# Patient Record
Sex: MALE | Race: WHITE | NOT HISPANIC OR LATINO
[De-identification: names, ages, dates, MRNs, and addresses within clinical notes are randomized per-mention and may not be internally consistent; named-entity substitution may affect disease eponyms.]

---

## 2017-10-06 ENCOUNTER — RX RENEWAL (OUTPATIENT)
Age: 62
End: 2017-10-06

## 2017-11-05 ENCOUNTER — RECORD ABSTRACTING (OUTPATIENT)
Age: 62
End: 2017-11-05

## 2017-11-05 DIAGNOSIS — D69.6 THROMBOCYTOPENIA, UNSPECIFIED: ICD-10-CM

## 2017-11-05 DIAGNOSIS — Z82.69 FAMILY HISTORY OF OTHER DISEASES OF THE MUSCULOSKELETAL SYSTEM AND CONNECTIVE TISSUE: ICD-10-CM

## 2017-11-05 DIAGNOSIS — K21.9 GASTRO-ESOPHAGEAL REFLUX DISEASE W/OUT ESOPHAGITIS: ICD-10-CM

## 2017-11-05 DIAGNOSIS — Z80.0 FAMILY HISTORY OF MALIGNANT NEOPLASM OF DIGESTIVE ORGANS: ICD-10-CM

## 2017-11-10 ENCOUNTER — APPOINTMENT (OUTPATIENT)
Dept: FAMILY MEDICINE | Facility: CLINIC | Age: 62
End: 2017-11-10
Payer: COMMERCIAL

## 2017-11-10 VITALS
BODY MASS INDEX: 26.66 KG/M2 | HEIGHT: 69 IN | SYSTOLIC BLOOD PRESSURE: 142 MMHG | HEART RATE: 58 BPM | WEIGHT: 180 LBS | DIASTOLIC BLOOD PRESSURE: 84 MMHG | TEMPERATURE: 97.9 F | OXYGEN SATURATION: 97 % | RESPIRATION RATE: 16 BRPM

## 2017-11-10 DIAGNOSIS — Z87.891 PERSONAL HISTORY OF NICOTINE DEPENDENCE: ICD-10-CM

## 2017-11-10 PROCEDURE — 99214 OFFICE O/P EST MOD 30 MIN: CPT | Mod: 25

## 2017-11-10 PROCEDURE — G0008: CPT

## 2017-11-10 PROCEDURE — 90686 IIV4 VACC NO PRSV 0.5 ML IM: CPT

## 2017-12-03 ENCOUNTER — TRANSCRIPTION ENCOUNTER (OUTPATIENT)
Age: 62
End: 2017-12-03

## 2018-07-23 PROBLEM — Z80.0 FAMILY HISTORY OF COLON CANCER: Status: ACTIVE | Noted: 2017-11-05

## 2018-08-09 ENCOUNTER — APPOINTMENT (OUTPATIENT)
Dept: FAMILY MEDICINE | Facility: CLINIC | Age: 63
End: 2018-08-09
Payer: COMMERCIAL

## 2018-08-09 ENCOUNTER — NON-APPOINTMENT (OUTPATIENT)
Age: 63
End: 2018-08-09

## 2018-08-09 VITALS
RESPIRATION RATE: 12 BRPM | HEIGHT: 69 IN | OXYGEN SATURATION: 97 % | SYSTOLIC BLOOD PRESSURE: 130 MMHG | BODY MASS INDEX: 27.25 KG/M2 | WEIGHT: 184 LBS | HEART RATE: 60 BPM | DIASTOLIC BLOOD PRESSURE: 80 MMHG

## 2018-08-09 PROCEDURE — 99243 OFF/OP CNSLTJ NEW/EST LOW 30: CPT | Mod: 25

## 2018-08-09 PROCEDURE — 93000 ELECTROCARDIOGRAM COMPLETE: CPT

## 2018-08-09 NOTE — CONSULT LETTER
[Dear  ___] : Dear  [unfilled], [Consult Letter:] : I had the pleasure of evaluating your patient, [unfilled]. [Consult Closing:] : Thank you very much for allowing me to participate in the care of this patient.  If you have any questions, please do not hesitate to contact me. [FreeTextEntry1] : Patient medically cleared

## 2018-08-09 NOTE — RESULTS/DATA
[ECG Reviewed] : reviewed [Normal] : The 12 - lead ECG is normal [Ventricular Rate___] : ventricular rate is [unfilled] beats per minute [Sinus Bradycardia] : sinus bradycardia [P Waves Normal] : the P wave is normal [Normal QRS] : the QRS is normal [Normal ST Segments] : the ST segments are normal [] : not indicated

## 2018-08-09 NOTE — HISTORY OF PRESENT ILLNESS
[No Pertinent Cardiac History] : no history of aortic stenosis, atrial fibrillation, coronary artery disease, recent myocardial infarction, or implantable device/pacemaker [No Pertinent Pulmonary History] : no history of asthma, COPD, sleep apnea, or smoking [No Adverse Anesthesia Reaction] : no adverse anesthesia reaction in self or family member [(Patient denies any chest pain, claudication, dyspnea on exertion, orthopnea, palpitations or syncope)] : Patient denies any chest pain, claudication, dyspnea on exertion, orthopnea, palpitations or syncope [Aortic Stenosis] : no aortic stenosis [Atrial Fibrillation] : no atrial fibrillation [Coronary Artery Disease] : no coronary artery disease [Recent Myocardial Infarction] : no recent myocardial infarction [Implantable Device/Pacemaker] : no implantable device/pacemaker [Asthma] : no asthma [COPD] : no COPD [Sleep Apnea] : no sleep apnea [Smoker] : not a smoker [Family Member] : no family member with adverse anesthesia reaction/sudden death [Self] : no previous adverse anesthesia reaction [Chronic Anticoagulation] : no chronic anticoagulation [Chronic Kidney Disease] : no chronic kidney disease [Diabetes] : no diabetes [FreeTextEntry1] : od Cataract [FreeTextEntry2] : 8/19/2018 [FreeTextEntry3] : Dr Ricardo Guerrero [FreeTextEntry4] : pt presents for medical clearance  for cataract

## 2018-08-09 NOTE — ASSESSMENT
[High Risk Surgery - Intraperitoneal, Intrathoracic or Supringuinal Vascular Procedures] : High Risk Surgery - Intraperitoneal, Intrathoracic or Supringuinal Vascular Procedures - No (0) [Ischemic Heart Disease] : Ischemic Heart Disease - No (0) [Congestive Heart Failure] : Congestive Heart Failure - No (0) [Prior Cerebrovascular Accident or TIA] : Prior Cerebrovascular Accident or TIA - No (0) [Creatinine >= 2mg/dL (1 Point)] : Creatinine >= 2mg/dL - No (0) [Insulin-dependent Diabetic (1 Point)] : Insulin-dependent Diabetic - No (0) [0] : 0 , RCRI Class: I, Risk of Post-Op Cardiac Complications: 0.4%, Procedure Risk: Low-Risk [Patient Optimized for Surgery] : Patient optimized for surgery [No Further Testing Recommended] : no further testing recommended [Continue medications as is] : Continue current medications [As per surgery] : as per surgery [FreeTextEntry4] : patient is medically cleared. \par

## 2018-08-09 NOTE — PHYSICAL EXAM
[Well Developed] : well developed [Well Nourished] : well nourished [Normal Rhythm/Effort] : normal respiratory rhythm and effort [Clear Bilaterally] : the lungs were clear to auscultation bilaterally [Normal to Percussion] : the lungs were normal to percussion [Normal] : palpation of the chest was normal [Normal Rate] : normal [Normal S1] : normal S1 [Normal S2] : normal S2 [No Murmur] : no murmurs heard [No Pitting Edema] : no pitting edema present [2+] : left 2+ [No Abnormalities] : the abdominal aorta was not enlarged and no bruit was heard [S3] : no S3 [S4] : no S4 [Right Carotid Bruit] : no bruit heard over the right carotid [Left Carotid Bruit] : no bruit heard over the left carotid [Right Femoral Bruit] : no bruit heard over the right femoral artery [Left Femoral Bruit] : no bruit heard over the left femoral artery [de-identified] : right eye worse vision at nighttime.

## 2018-10-01 ENCOUNTER — MEDICATION RENEWAL (OUTPATIENT)
Age: 63
End: 2018-10-01

## 2019-01-16 ENCOUNTER — APPOINTMENT (OUTPATIENT)
Dept: FAMILY MEDICINE | Facility: CLINIC | Age: 64
End: 2019-01-16

## 2019-05-15 ENCOUNTER — APPOINTMENT (OUTPATIENT)
Dept: FAMILY MEDICINE | Facility: CLINIC | Age: 64
End: 2019-05-15
Payer: COMMERCIAL

## 2019-05-15 ENCOUNTER — NON-APPOINTMENT (OUTPATIENT)
Age: 64
End: 2019-05-15

## 2019-05-15 VITALS
OXYGEN SATURATION: 96 % | BODY MASS INDEX: 26.66 KG/M2 | HEIGHT: 69 IN | HEART RATE: 88 BPM | RESPIRATION RATE: 14 BRPM | WEIGHT: 180 LBS

## 2019-05-15 DIAGNOSIS — L98.9 DISORDER OF THE SKIN AND SUBCUTANEOUS TISSUE, UNSPECIFIED: ICD-10-CM

## 2019-05-15 DIAGNOSIS — Z92.29 PERSONAL HISTORY OF OTHER DRUG THERAPY: ICD-10-CM

## 2019-05-15 PROCEDURE — 99242 OFF/OP CONSLTJ NEW/EST SF 20: CPT | Mod: 25

## 2019-05-15 PROCEDURE — 36415 COLL VENOUS BLD VENIPUNCTURE: CPT

## 2019-05-15 PROCEDURE — 93000 ELECTROCARDIOGRAM COMPLETE: CPT

## 2019-05-15 NOTE — ASSESSMENT
[High Risk Surgery - Intraperitoneal, Intrathoracic or Supringuinal Vascular Procedures] : High Risk Surgery - Intraperitoneal, Intrathoracic or Supringuinal Vascular Procedures - No (0) [Ischemic Heart Disease] : Ischemic Heart Disease - No (0) [Congestive Heart Failure] : Congestive Heart Failure - No (0) [Prior Cerebrovascular Accident or TIA] : Prior Cerebrovascular Accident or TIA - No (0) [Insulin-dependent Diabetic (1 Point)] : Insulin-dependent Diabetic - No (0) [Creatinine >= 2mg/dL (1 Point)] : Creatinine >= 2mg/dL - No (0) [0] : 0 , RCRI Class: I, Risk of Post-Op Cardiac Complications: 0.4%, Procedure Risk: Low-Risk [Patient Optimized for Surgery] : Patient optimized for surgery [As per surgery] : as per surgery [No Further Testing Recommended] : no further testing recommended [Continue medications as is] : Continue current medications [FreeTextEntry4] : patient medically cleared

## 2019-05-15 NOTE — PHYSICAL EXAM
[No Acute Distress] : no acute distress [Well Nourished] : well nourished [Well Developed] : well developed [Well-Appearing] : well-appearing [EOMI] : extraocular movements intact [PERRL] : pupils equal round and reactive to light [Normal Sclera/Conjunctiva] : normal sclera/conjunctiva [Normal Oropharynx] : the oropharynx was normal [No JVD] : no jugular venous distention [Normal Outer Ear/Nose] : the outer ears and nose were normal in appearance [Supple] : supple [No Lymphadenopathy] : no lymphadenopathy [Thyroid Normal, No Nodules] : the thyroid was normal and there were no nodules present [No Respiratory Distress] : no respiratory distress  [Clear to Auscultation] : lungs were clear to auscultation bilaterally [No Accessory Muscle Use] : no accessory muscle use [Regular Rhythm] : with a regular rhythm [Normal Rate] : normal rate  [No Murmur] : no murmur heard [Normal S1, S2] : normal S1 and S2 [No Carotid Bruits] : no carotid bruits [No Abdominal Bruit] : a ~M bruit was not heard ~T in the abdomen [No Varicosities] : no varicosities [No Edema] : there was no peripheral edema [Pedal Pulses Present] : the pedal pulses are present [No Palpable Aorta] : no palpable aorta [No Extremity Clubbing/Cyanosis] : no extremity clubbing/cyanosis [Soft] : abdomen soft [Non Tender] : non-tender [No Masses] : no abdominal mass palpated [Non-distended] : non-distended [Normal Bowel Sounds] : normal bowel sounds [No HSM] : no HSM [Normal Posterior Cervical Nodes] : no posterior cervical lymphadenopathy [Normal Anterior Cervical Nodes] : no anterior cervical lymphadenopathy [No CVA Tenderness] : no CVA  tenderness [No Joint Swelling] : no joint swelling [No Spinal Tenderness] : no spinal tenderness [No Rash] : no rash [Grossly Normal Strength/Tone] : grossly normal strength/tone [Normal Gait] : normal gait [Coordination Grossly Intact] : coordination grossly intact [No Focal Deficits] : no focal deficits [Deep Tendon Reflexes (DTR)] : deep tendon reflexes were 2+ and symmetric [Normal Insight/Judgement] : insight and judgment were intact [Normal Affect] : the affect was normal

## 2019-05-15 NOTE — RESULTS/DATA
[ECG Reviewed] : reviewed [Normal] : The 12 - lead ECG is normal [NSR] : normal sinus rhythm [Normal QRS] : the QRS is normal [P Waves Normal] : the P wave is normal [Normal ST Segments] : the ST segments are normal [Ventricular Rate___] : ventricular rate is [unfilled] beats per minute

## 2019-05-15 NOTE — HISTORY OF PRESENT ILLNESS
[No Pertinent Pulmonary History] : no history of asthma, COPD, sleep apnea, or smoking [No Pertinent Cardiac History] : no history of aortic stenosis, atrial fibrillation, coronary artery disease, recent myocardial infarction, or implantable device/pacemaker [No Adverse Anesthesia Reaction] : no adverse anesthesia reaction in self or family member [(Patient denies any chest pain, claudication, dyspnea on exertion, orthopnea, palpitations or syncope)] : Patient denies any chest pain, claudication, dyspnea on exertion, orthopnea, palpitations or syncope [Chronic Anticoagulation] : no chronic anticoagulation [Chronic Kidney Disease] : no chronic kidney disease [Diabetes] : no diabetes [FreeTextEntry1] : Cataract extraction with lens implant [FreeTextEntry2] : 05/21/2019 [FreeTextEntry4] : patient presents for medical clearance  for cataract surgery with lens implant [FreeTextEntry3] : Julio C mensah MD

## 2019-05-21 ENCOUNTER — TRANSCRIPTION ENCOUNTER (OUTPATIENT)
Age: 64
End: 2019-05-21

## 2019-05-21 LAB
MEV IGG FLD QL IA: <5 AU/ML
MEV IGG+IGM SER-IMP: NEGATIVE
MUV AB SER-ACNC: POSITIVE
MUV IGG SER QL IA: 95.1 AU/ML
RUBV IGG FLD-ACNC: <0.1 INDEX
RUBV IGG SER-IMP: NEGATIVE

## 2019-06-13 ENCOUNTER — APPOINTMENT (OUTPATIENT)
Dept: FAMILY MEDICINE | Facility: CLINIC | Age: 64
End: 2019-06-13
Payer: COMMERCIAL

## 2019-06-13 PROCEDURE — 90471 IMMUNIZATION ADMIN: CPT

## 2019-06-13 PROCEDURE — 90707 MMR VACCINE SC: CPT

## 2020-01-10 ENCOUNTER — RX RENEWAL (OUTPATIENT)
Age: 65
End: 2020-01-10

## 2020-10-30 ENCOUNTER — APPOINTMENT (OUTPATIENT)
Dept: FAMILY MEDICINE | Facility: CLINIC | Age: 65
End: 2020-10-30
Payer: COMMERCIAL

## 2020-10-30 DIAGNOSIS — Z87.09 PERSONAL HISTORY OF OTHER DISEASES OF THE RESPIRATORY SYSTEM: ICD-10-CM

## 2020-10-30 DIAGNOSIS — Z01.818 ENCOUNTER FOR OTHER PREPROCEDURAL EXAMINATION: ICD-10-CM

## 2020-10-30 PROCEDURE — 99442: CPT

## 2020-10-30 NOTE — HISTORY OF PRESENT ILLNESS
[FreeTextEntry8] : Patient initiated communication for concern via HIPAA secure platform  (Telemedicine )  for         sinus             using   phone           .Reviewed quarantine instructions and self isolation to limit spread of disease  as per [place] guidelines.  Patient is an established patient and has verbalized consent to be treated via telemedicine .\par Over the years I have clogged ears and my teeth were hurting  right sinus was painful  I took a shower and the teeth pain went away. I went to the dentist yesterday no tooth problem\par no fever chills cough \par no recent travel\par no covid exposure\par

## 2020-10-30 NOTE — REVIEW OF SYSTEMS
[Postnasal Drip] : postnasal drip [Nasal Discharge] : nasal discharge [Negative] : Respiratory [FreeTextEntry4] : teeth  pain

## 2020-10-30 NOTE — ASSESSMENT
[FreeTextEntry1] : Take antibiotics,  rest,  increase fluids, wash hands to prevent the spread of  infection . Take mucinex   over the counter. Take tylenol or advil for fever or body aches. Follow up if no better or worse respiratory symptoms.\par Please note this assessment was done using telemedicine as a result of social distancing due to the outbreak of COVID 19 .\par

## 2020-12-23 PROBLEM — Z87.09 HISTORY OF ACUTE SINUSITIS: Status: RESOLVED | Noted: 2020-10-30 | Resolved: 2020-12-23

## 2020-12-29 ENCOUNTER — RX RENEWAL (OUTPATIENT)
Age: 65
End: 2020-12-29

## 2020-12-31 ENCOUNTER — NON-APPOINTMENT (OUTPATIENT)
Age: 65
End: 2020-12-31

## 2020-12-31 ENCOUNTER — APPOINTMENT (OUTPATIENT)
Dept: FAMILY MEDICINE | Facility: CLINIC | Age: 65
End: 2020-12-31
Payer: COMMERCIAL

## 2020-12-31 VITALS
OXYGEN SATURATION: 98 % | WEIGHT: 180 LBS | RESPIRATION RATE: 14 BRPM | HEART RATE: 80 BPM | SYSTOLIC BLOOD PRESSURE: 150 MMHG | BODY MASS INDEX: 26.66 KG/M2 | TEMPERATURE: 97.8 F | HEIGHT: 69 IN | DIASTOLIC BLOOD PRESSURE: 90 MMHG

## 2020-12-31 DIAGNOSIS — I10 ESSENTIAL (PRIMARY) HYPERTENSION: ICD-10-CM

## 2020-12-31 PROCEDURE — 36415 COLL VENOUS BLD VENIPUNCTURE: CPT

## 2020-12-31 PROCEDURE — 93000 ELECTROCARDIOGRAM COMPLETE: CPT

## 2020-12-31 PROCEDURE — 99213 OFFICE O/P EST LOW 20 MIN: CPT | Mod: 25

## 2020-12-31 PROCEDURE — 99072 ADDL SUPL MATRL&STAF TM PHE: CPT

## 2020-12-31 RX ORDER — AMOXICILLIN AND CLAVULANATE POTASSIUM 875; 125 MG/1; MG/1
875-125 TABLET, COATED ORAL
Qty: 20 | Refills: 0 | Status: DISCONTINUED | COMMUNITY
Start: 2020-10-30 | End: 2020-12-31

## 2020-12-31 NOTE — ASSESSMENT
[FreeTextEntry1] : Basic cardiovascular prevention measures are advised including regular exercise, surveillance medical examination, and prudent portion-controlled low fat diet, rich in a variety of vegetables with minimal added sugars, refined starches, and no artificially hydrogenated oils.\par Discussion and interpretation of previous tests , external notes , radiology, specialist gastro derm , patient verbalized understanding.\par Prescription drug management- lotrel renewed \par white coat htn - cont to check bp outside of office\par EKG performed on this day in the office and reviewed by GLORIA Hernández. It is stable sinus harrison\par

## 2020-12-31 NOTE — HISTORY OF PRESENT ILLNESS
[FreeTextEntry1] : Patient present for medication renewal\par  [de-identified] : 64 yo wm here for htn\par He sees dermatology and gastro 130/84 - he had his colonoscopy it was excellent\par Otherwise patient reports feeling well.  Patient specifically denies chest pain, shortness of breath, dyspnea on exertion, edema, PND, orthopnea, dizziness, or syncope. Patient reports compliance with medications. Patient denies fever, chills, night sweats, nausea, vomiting , no pain, erythema, swollen joints, hematuria, hematochezia , hematemesis, or melena.\par  \par

## 2020-12-31 NOTE — HEALTH RISK ASSESSMENT
[] : No [No] : In the past 12 months have you used drugs other than those required for medical reasons? No [de-identified] : colonoscopy [de-identified] : gastro derm [Audit-CScore] : 0 [de-identified] : yes [de-identified] : good

## 2021-01-01 ENCOUNTER — TRANSCRIPTION ENCOUNTER (OUTPATIENT)
Age: 66
End: 2021-01-01

## 2021-01-01 LAB
ALBUMIN SERPL ELPH-MCNC: 4.9 G/DL
ALP BLD-CCNC: 99 U/L
ALT SERPL-CCNC: 64 U/L
ANION GAP SERPL CALC-SCNC: 16 MMOL/L
AST SERPL-CCNC: 55 U/L
BASOPHILS # BLD AUTO: 0.08 K/UL
BASOPHILS NFR BLD AUTO: 1 %
BILIRUB SERPL-MCNC: 1.1 MG/DL
BUN SERPL-MCNC: 18 MG/DL
CALCIUM SERPL-MCNC: 9.5 MG/DL
CHLORIDE SERPL-SCNC: 101 MMOL/L
CO2 SERPL-SCNC: 19 MMOL/L
CREAT SERPL-MCNC: 0.99 MG/DL
EOSINOPHIL # BLD AUTO: 0.1 K/UL
EOSINOPHIL NFR BLD AUTO: 1.3 %
GLUCOSE SERPL-MCNC: 99 MG/DL
HCT VFR BLD CALC: 50.1 %
HGB BLD-MCNC: 16.9 G/DL
IMM GRANULOCYTES NFR BLD AUTO: 0.1 %
IRON SATN MFR SERPL: 51 %
IRON SERPL-MCNC: 166 UG/DL
LYMPHOCYTES # BLD AUTO: 2.23 K/UL
LYMPHOCYTES NFR BLD AUTO: 28.8 %
MAGNESIUM SERPL-MCNC: 2.3 MG/DL
MAN DIFF?: NORMAL
MCHC RBC-ENTMCNC: 31 PG
MCHC RBC-ENTMCNC: 33.7 GM/DL
MCV RBC AUTO: 91.8 FL
MONOCYTES # BLD AUTO: 0.51 K/UL
MONOCYTES NFR BLD AUTO: 6.6 %
NEUTROPHILS # BLD AUTO: 4.81 K/UL
NEUTROPHILS NFR BLD AUTO: 62.2 %
PLATELET # BLD AUTO: 150 K/UL
POTASSIUM SERPL-SCNC: 4.6 MMOL/L
PROT SERPL-MCNC: 7.7 G/DL
PSA SERPL-MCNC: 1.78 NG/ML
RBC # BLD: 5.46 M/UL
RBC # FLD: 12.4 %
SODIUM SERPL-SCNC: 136 MMOL/L
TIBC SERPL-MCNC: 323 UG/DL
TSH SERPL-ACNC: 1.78 UIU/ML
UIBC SERPL-MCNC: 157 UG/DL
WBC # FLD AUTO: 7.74 K/UL

## 2022-01-02 ENCOUNTER — RX RENEWAL (OUTPATIENT)
Age: 67
End: 2022-01-02

## 2022-01-03 ENCOUNTER — TRANSCRIPTION ENCOUNTER (OUTPATIENT)
Age: 67
End: 2022-01-03

## 2022-03-04 ENCOUNTER — TRANSCRIPTION ENCOUNTER (OUTPATIENT)
Age: 67
End: 2022-03-04

## 2022-07-04 DIAGNOSIS — Z00.00 ENCOUNTER FOR GENERAL ADULT MEDICAL EXAMINATION W/OUT ABNORMAL FINDINGS: ICD-10-CM

## 2022-07-14 ENCOUNTER — APPOINTMENT (OUTPATIENT)
Dept: FAMILY MEDICINE | Facility: CLINIC | Age: 67
End: 2022-07-14

## 2022-07-14 ENCOUNTER — NON-APPOINTMENT (OUTPATIENT)
Age: 67
End: 2022-07-14

## 2022-07-14 VITALS
HEART RATE: 78 BPM | DIASTOLIC BLOOD PRESSURE: 100 MMHG | OXYGEN SATURATION: 97 % | SYSTOLIC BLOOD PRESSURE: 150 MMHG | HEIGHT: 69 IN | BODY MASS INDEX: 27.4 KG/M2 | WEIGHT: 185 LBS | RESPIRATION RATE: 14 BRPM

## 2022-07-14 VITALS — TEMPERATURE: 96.6 F

## 2022-07-14 DIAGNOSIS — I45.10 UNSPECIFIED RIGHT BUNDLE-BRANCH BLOCK: ICD-10-CM

## 2022-07-14 DIAGNOSIS — C44.319 BASAL CELL CARCINOMA OF SKIN OF OTHER PARTS OF FACE: ICD-10-CM

## 2022-07-14 DIAGNOSIS — Z86.69 PERSONAL HISTORY OF OTHER DISEASES OF THE NERVOUS SYSTEM AND SENSE ORGANS: ICD-10-CM

## 2022-07-14 PROCEDURE — 99396 PREV VISIT EST AGE 40-64: CPT | Mod: 25

## 2022-07-14 PROCEDURE — 93000 ELECTROCARDIOGRAM COMPLETE: CPT

## 2022-07-14 RX ORDER — PANTOPRAZOLE 40 MG/1
40 TABLET, DELAYED RELEASE ORAL
Qty: 90 | Refills: 0 | Status: ACTIVE | COMMUNITY
Start: 2021-10-04

## 2022-07-14 NOTE — HEALTH RISK ASSESSMENT
[No falls in past year] : Patient reported no falls in the past year [0] : 2) Feeling down, depressed, or hopeless: Not at all (0) [PHQ-2 Negative - No further assessment needed] : PHQ-2 Negative - No further assessment needed [I have developed a follow-up plan documented below in the note.] : I have developed a follow-up plan documented below in the note. [HIV test declined] : HIV test declined [Hepatitis C test declined] : Hepatitis C test declined [With Family] : lives with family [# of Members in Household ___] :  household currently consist of [unfilled] member(s) [Employed] : employed [College] : College [] :  [# Of Children ___] : has [unfilled] children [Fully functional (bathing, dressing, toileting, transferring, walking, feeding)] : Fully functional (bathing, dressing, toileting, transferring, walking, feeding) [Fully functional (using the telephone, shopping, preparing meals, housekeeping, doing laundry, using] : Fully functional and needs no help or supervision to perform IADLs (using the telephone, shopping, preparing meals, housekeeping, doing laundry, using transportation, managing medications and managing finances) [Reports normal functional visual acuity (ie: able to read med bottle)] : Reports normal functional visual acuity [Smoke Detector] : smoke detector [Carbon Monoxide Detector] : carbon monoxide detector [Safety elements used in home] : safety elements used in home [Seat Belt] :  uses seat belt [Sunscreen] : uses sunscreen [With Patient/Caregiver] : , with patient/caregiver [Designated Healthcare Proxy] : Designated healthcare proxy [Name: ___] : Health Care Proxy's Name: [unfilled]  [Aggressive treatment] : aggressive treatment [Good] : ~his/her~  mood as  good [Former] : Former [10-14] : 10-14 [No] : In the past 12 months have you used drugs other than those required for medical reasons? 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 [de-identified] : 1 ppd 15 y [YearQuit] : 1997 [Audit-CScore] : 0 [de-identified] : gym 5x  week [de-identified] : yes [[id number]] : 0 [EyeExamDate] : 12/2022 [Change in mental status noted] : No change in mental status noted [Language] : denies difficulty with language [Behavior] : denies difficulty with behavior [Learning/Retaining New Information] : denies difficulty learning/retaining new information [Handling Complex Tasks] : denies difficulty handling complex tasks [Reasoning] : denies difficulty with reasoning [Spatial Ability and Orientation] : denies difficulty with spatial ability and orientation [Reports changes in hearing] : Reports no changes in hearing [Reports changes in vision] : Reports no changes in vision [Reports changes in dental health] : Reports no changes in dental health [Guns at Home] : no guns at home [Travel to Developing Areas] : does not  travel to developing areas [TB Exposure] : is not being exposed to tuberculosis [Caregiver Concerns] : does not have caregiver concerns [ColonoscopyDate] : 8/25/2020 [ColonoscopyComments] : diverticulosis sigmoid  [AdvancecareDate] : 7/14/22

## 2022-07-14 NOTE — ASSESSMENT
[FreeTextEntry1] : Basic cardiovascular prevention measures are advised including regular exercise, surveillance medical examination, and prudent portion-controlled low fat diet, rich in a variety of vegetables with minimal added sugars, refined starches, and no artificially hydrogenated oils.\par Discussion and interpretation of previous tests , external notes( labs, radiology, specialist  , patient verbalized understanding.\par Prescription drug management- cont lotrel I did not renew he has enough\par EKG performed on this day in the office  to evaluate for any ischemia, new arrhythmia, and for any significant changes and reviewed by GLORIA Hernández.  RBBB new refer to cardiology\par  \par  \par colonoscopy utd\par  suggest prev 20 \par suggest covid in sept or oct /flu combination\par \par Labs to be drawn/ specimens obtained  at outside  lab    for evaluation of   assessed conditions - cbc cmp lipid    hgba1c for physical and screening purposes.\par \par

## 2022-08-24 ENCOUNTER — APPOINTMENT (OUTPATIENT)
Dept: FAMILY MEDICINE | Facility: CLINIC | Age: 67
End: 2022-08-24

## 2022-08-24 PROCEDURE — 90707 MMR VACCINE SC: CPT

## 2022-08-24 PROCEDURE — 90471 IMMUNIZATION ADMIN: CPT

## 2022-09-21 DIAGNOSIS — Z11.59 ENCOUNTER FOR SCREENING FOR OTHER VIRAL DISEASES: ICD-10-CM

## 2022-09-21 DIAGNOSIS — Z92.29 PERSONAL HISTORY OF OTHER DRUG THERAPY: ICD-10-CM

## 2023-12-21 ENCOUNTER — RX RENEWAL (OUTPATIENT)
Age: 68
End: 2023-12-21

## 2024-01-29 ENCOUNTER — APPOINTMENT (OUTPATIENT)
Dept: FAMILY MEDICINE | Facility: CLINIC | Age: 69
End: 2024-01-29
Payer: COMMERCIAL

## 2024-01-29 VITALS
HEART RATE: 77 BPM | TEMPERATURE: 97.8 F | BODY MASS INDEX: 30.66 KG/M2 | SYSTOLIC BLOOD PRESSURE: 128 MMHG | RESPIRATION RATE: 14 BRPM | DIASTOLIC BLOOD PRESSURE: 82 MMHG | WEIGHT: 207 LBS | HEIGHT: 69 IN | OXYGEN SATURATION: 98 %

## 2024-01-29 DIAGNOSIS — Z11.59 ENCOUNTER FOR SCREENING FOR OTHER VIRAL DISEASES: ICD-10-CM

## 2024-01-29 DIAGNOSIS — Z87.440 PERSONAL HISTORY OF URINARY (TRACT) INFECTIONS: ICD-10-CM

## 2024-01-29 DIAGNOSIS — Z87.11 PERSONAL HISTORY OF PEPTIC ULCER DISEASE: ICD-10-CM

## 2024-01-29 DIAGNOSIS — M25.542 PAIN IN JOINTS OF LEFT HAND: ICD-10-CM

## 2024-01-29 DIAGNOSIS — Z23 ENCOUNTER FOR IMMUNIZATION: ICD-10-CM

## 2024-01-29 DIAGNOSIS — Z00.00 ENCOUNTER FOR GENERAL ADULT MEDICAL EXAMINATION W/OUT ABNORMAL FINDINGS: ICD-10-CM

## 2024-01-29 DIAGNOSIS — Z92.89 PERSONAL HISTORY OF OTHER MEDICAL TREATMENT: ICD-10-CM

## 2024-01-29 PROCEDURE — 99397 PER PM REEVAL EST PAT 65+ YR: CPT | Mod: 25

## 2024-01-29 PROCEDURE — 90715 TDAP VACCINE 7 YRS/> IM: CPT

## 2024-01-29 PROCEDURE — 90471 IMMUNIZATION ADMIN: CPT

## 2024-01-29 NOTE — PHYSICAL EXAM
[No Acute Distress] : no acute distress [Well Nourished] : well nourished [Well Developed] : well developed [Well-Appearing] : well-appearing [Normal Sclera/Conjunctiva] : normal sclera/conjunctiva [PERRL] : pupils equal round and reactive to light [EOMI] : extraocular movements intact [Normal Outer Ear/Nose] : the outer ears and nose were normal in appearance [Normal Oropharynx] : the oropharynx was normal [No JVD] : no jugular venous distention [No Lymphadenopathy] : no lymphadenopathy [Supple] : supple [Thyroid Normal, No Nodules] : the thyroid was normal and there were no nodules present [No Respiratory Distress] : no respiratory distress  [No Accessory Muscle Use] : no accessory muscle use [Clear to Auscultation] : lungs were clear to auscultation bilaterally [Normal Rate] : normal rate  [Regular Rhythm] : with a regular rhythm [Normal S1, S2] : normal S1 and S2 [No Murmur] : no murmur heard [No Carotid Bruits] : no carotid bruits [No Abdominal Bruit] : a ~M bruit was not heard ~T in the abdomen [No Varicosities] : no varicosities [Pedal Pulses Present] : the pedal pulses are present [No Edema] : there was no peripheral edema [No Palpable Aorta] : no palpable aorta [No Extremity Clubbing/Cyanosis] : no extremity clubbing/cyanosis [Soft] : abdomen soft [Non Tender] : non-tender [Non-distended] : non-distended [No Masses] : no abdominal mass palpated [No HSM] : no HSM [Normal Bowel Sounds] : normal bowel sounds [Normal Posterior Cervical Nodes] : no posterior cervical lymphadenopathy [Normal Anterior Cervical Nodes] : no anterior cervical lymphadenopathy [No CVA Tenderness] : no CVA  tenderness [No Spinal Tenderness] : no spinal tenderness [No Joint Swelling] : no joint swelling [Grossly Normal Strength/Tone] : grossly normal strength/tone [Coordination Grossly Intact] : coordination grossly intact [No Focal Deficits] : no focal deficits [Normal Gait] : normal gait [Deep Tendon Reflexes (DTR)] : deep tendon reflexes were 2+ and symmetric [Normal Affect] : the affect was normal [Normal Insight/Judgement] : insight and judgment were intact [Urethral Meatus] : meatus normal [Penis Abnormality] : normal circumcised penis [Scrotum] : the scrotum was normal [Epididymis] : the epididymides were normal [Testes Tenderness] : no tenderness of the testes [Testes Mass (___cm)] : there were no testicular masses [Anus Abnormality] : the anus and perineum were normal [Prostate Nodule] : had a nodule [Normal Sphincter Tone] : normal sphincter tone [Normal Supraclavicular Nodes] : no supraclavicular lymphadenopathy [Kyphosis] : no kyphosis [Normal] : normal gait, coordination grossly intact, no focal deficits and deep tendon reflexes were 2+ and symmetric [Speech Grossly Normal] : speech grossly normal [Memory Grossly Normal] : memory grossly normal [Alert and Oriented x3] : oriented to person, place, and time [Normal Mood] : the mood was normal [Comprehensive Foot Exam Normal] : Right and left foot were examined and both feet are normal. No ulcers in either foot. Toes are normal and with full ROM.  Normal tactile sensation with monofilament testing throughout both feet [de-identified] : left calf scar from excision

## 2024-01-29 NOTE — COUNSELING
[None] : None [Good understanding] : Patient has a good understanding of lifestyle changes and steps needed to achieve self management goal [Fall prevention counseling provided] : Fall prevention counseling provided [Adequate lighting] : Adequate lighting [No throw rugs] : No throw rugs [Use proper foot wear] : Use proper foot wear [Behavioral health counseling provided] : Behavioral health counseling provided [Engage in a relaxing activity] : Engage in a relaxing activity [No] : Risk of tobacco use and health benefits of smoking cessation discussed: No

## 2024-01-29 NOTE — HEALTH RISK ASSESSMENT
[Good] : ~his/her~  mood as  good [No] : In the past 12 months have you used drugs other than those required for medical reasons? No [No falls in past year] : Patient reported no falls in the past year [0] : 2) Feeling down, depressed, or hopeless: Not at all (0) [PHQ-2 Negative - No further assessment needed] : PHQ-2 Negative - No further assessment needed [I have developed a follow-up plan documented below in the note.] : I have developed a follow-up plan documented below in the note. [No Retinopathy] : No retinopathy [Patient declined bone density test] : Patient declined bone density test [Patient reported colonoscopy was normal] : Patient reported colonoscopy was normal [HIV test declined] : HIV test declined [Hepatitis C test declined] : Hepatitis C test declined [With Family] : lives with family [# of Members in Household ___] :  household currently consist of [unfilled] member(s) [Employed] : employed [] :  [# Of Children ___] : has [unfilled] children [Sexually Active] : sexually active [Feels Safe at Home] : Feels safe at home [Fully functional (bathing, dressing, toileting, transferring, walking, feeding)] : Fully functional (bathing, dressing, toileting, transferring, walking, feeding) [Fully functional (using the telephone, shopping, preparing meals, housekeeping, doing laundry, using] : Fully functional and needs no help or supervision to perform IADLs (using the telephone, shopping, preparing meals, housekeeping, doing laundry, using transportation, managing medications and managing finances) [Reports normal functional visual acuity (ie: able to read med bottle)] : Reports normal functional visual acuity [Smoke Detector] : smoke detector [Carbon Monoxide Detector] : carbon monoxide detector [Safety elements used in home] : safety elements used in home [Seat Belt] :  uses seat belt [Sunscreen] : uses sunscreen [With Patient/Caregiver] : , with patient/caregiver [Designated Healthcare Proxy] : Designated healthcare proxy [Name: ___] : Health Care Proxy's Name: [unfilled]  [Relationship: ___] : Relationship: [unfilled] [Aggressive treatment] : aggressive treatment [Very Good] : ~his/her~ current health as very good [Little interest or pleasure doing things] : 1) Little interest or pleasure doing things [Feeling down, depressed, or hopeless] : 2) Feeling down, depressed, or hopeless [None] : None [Graduate School] : graduate school [I will adhere to the patient's wishes.] : I will adhere to the patient's wishes. [Former] : Former [20 or more] : 20 or more [> 15 Years] : > 15 Years [FreeTextEntry1] : physical [de-identified] : no [de-identified] : derm  gastro opthalmology cardiology [Audit-CScore] : 0 [de-identified] : gym 5x  week [de-identified] : yes, patient states eating a heathly diet but can improve  [[id number]] : 0 [EyeExamDate] : 01/2024 [Change in mental status noted] : No change in mental status noted [Language] : denies difficulty with language [Behavior] : denies difficulty with behavior [Learning/Retaining New Information] : denies difficulty learning/retaining new information [Handling Complex Tasks] : denies difficulty handling complex tasks [Reasoning] : denies difficulty with reasoning [Spatial Ability and Orientation] : denies difficulty with spatial ability and orientation [Reports changes in hearing] : Reports no changes in hearing [Reports changes in vision] : Reports no changes in vision [Reports changes in dental health] : Reports no changes in dental health [Guns at Home] : no guns at home [Travel to Developing Areas] : does not  travel to developing areas [TB Exposure] : is not being exposed to tuberculosis [Caregiver Concerns] : does not have caregiver concerns [ColonoscopyDate] : 8/25/2020 [ColonoscopyComments] : diverticulosis sigmoid  [FreeTextEntry2] :  company [AdvancecareDate] : 01/29/2024

## 2024-01-29 NOTE — HISTORY OF PRESENT ILLNESS
[FreeTextEntry1] : patient presents for CPE  [de-identified] : physical no complaints left 3rd and 4th finger pain after tripping over curb, hand went into door handle and broke his fall in August. went to spin class afterwards. pain in the morning

## 2024-01-29 NOTE — ASSESSMENT
[FreeTextEntry1] : Basic cardiovascular prevention measures are advised including regular exercise, surveillance medical examination, and prudent portion-controlled low fat diet, rich in a variety of vegetables with minimal added sugars, refined starches, and no artificially hydrogenated oils. Discussion and interpretation of previous tests , external notes( labs, radiology, specialist  , patient verbalized understanding. Prescription drug management-  continue with amlodipine and pantoprazole  Information regarding  Tdap     immunization reviewed. All questions answered. Immunization given   .5 cc  intramuscular   L     deltoid. No reaction noted. Advised patients to wash hands to reduce the spread of infection   colonoscopy utd  suggest prev 20  complaining of left 3rd and 4th finger pain, check left hand xray to rule out fracture Labs to be drawn/ specimens obtained  at outside  lab    for evaluation of   assessed conditions - cbc cmp lipid    hgba1c for physical and screening purposes.

## 2024-01-29 NOTE — REVIEW OF SYSTEMS
[Heartburn] : heartburn [Joint Pain] : joint pain [Insomnia] : insomnia [Negative] : Genitourinary [FreeTextEntry9] : left 3rd and 4th finger pain [de-identified] : left calf excision and scar [de-identified] : natural worrier

## 2024-02-11 ENCOUNTER — TRANSCRIPTION ENCOUNTER (OUTPATIENT)
Age: 69
End: 2024-02-11

## 2024-02-13 ENCOUNTER — TRANSCRIPTION ENCOUNTER (OUTPATIENT)
Age: 69
End: 2024-02-13

## 2024-03-23 ENCOUNTER — RX RENEWAL (OUTPATIENT)
Age: 69
End: 2024-03-23

## 2024-06-21 ENCOUNTER — RX RENEWAL (OUTPATIENT)
Age: 69
End: 2024-06-21

## 2024-06-21 RX ORDER — AMLODIPINE BESYLATE AND BENAZEPRIL HYDROCHLORIDE 10; 20 MG/1; MG/1
10-20 CAPSULE ORAL
Qty: 90 | Refills: 1 | Status: ACTIVE | COMMUNITY
Start: 2017-10-06 | End: 1900-01-01

## 2024-12-13 ENCOUNTER — RX RENEWAL (OUTPATIENT)
Age: 69
End: 2024-12-13

## 2025-01-30 ENCOUNTER — NON-APPOINTMENT (OUTPATIENT)
Age: 70
End: 2025-01-30

## 2025-01-30 ENCOUNTER — APPOINTMENT (OUTPATIENT)
Dept: FAMILY MEDICINE | Facility: CLINIC | Age: 70
End: 2025-01-30
Payer: COMMERCIAL

## 2025-01-30 VITALS
SYSTOLIC BLOOD PRESSURE: 140 MMHG | RESPIRATION RATE: 12 BRPM | HEIGHT: 69 IN | BODY MASS INDEX: 29.77 KG/M2 | OXYGEN SATURATION: 97 % | WEIGHT: 201 LBS | DIASTOLIC BLOOD PRESSURE: 90 MMHG | HEART RATE: 59 BPM

## 2025-01-30 DIAGNOSIS — Z23 ENCOUNTER FOR IMMUNIZATION: ICD-10-CM

## 2025-01-30 DIAGNOSIS — Z00.00 ENCOUNTER FOR GENERAL ADULT MEDICAL EXAMINATION W/OUT ABNORMAL FINDINGS: ICD-10-CM

## 2025-01-30 DIAGNOSIS — M25.542 PAIN IN JOINTS OF LEFT HAND: ICD-10-CM

## 2025-01-30 PROCEDURE — 99397 PER PM REEVAL EST PAT 65+ YR: CPT

## 2025-01-30 PROCEDURE — 93000 ELECTROCARDIOGRAM COMPLETE: CPT

## 2025-02-13 ENCOUNTER — OFFICE (OUTPATIENT)
Dept: URBAN - METROPOLITAN AREA CLINIC 12 | Facility: CLINIC | Age: 70
Setting detail: OPHTHALMOLOGY
End: 2025-02-13
Payer: COMMERCIAL

## 2025-02-13 VITALS — HEIGHT: 60 IN

## 2025-02-13 DIAGNOSIS — H26.493: ICD-10-CM

## 2025-02-13 DIAGNOSIS — H43.393: ICD-10-CM

## 2025-02-13 DIAGNOSIS — Z96.1: ICD-10-CM

## 2025-02-13 DIAGNOSIS — H53.001: ICD-10-CM

## 2025-02-13 PROCEDURE — 92004 COMPRE OPH EXAM NEW PT 1/>: CPT | Performed by: OPHTHALMOLOGY

## 2025-02-13 ASSESSMENT — REFRACTION_MANIFEST
OD_VA1: 20/NI
OS_VA1: 20/20
OD_CYLINDER: -1.25
OD_CYLINDER: -0.75
OS_VA1: 20/50+3
OS_CYLINDER: -0.75
OD_SPHERE: +1.25
OD_SPHERE: +0.50
OD_VA1: 20/50
OD_AXIS: 015
OS_AXIS: 55
OS_SPHERE: +0.75
OS_AXIS: 015
OS_CYLINDER: -0.50
OS_SPHERE: +3.75
OD_AXIS: 05

## 2025-02-13 ASSESSMENT — KERATOMETRY
METHOD_AUTO_MANUAL: AUTO
OS_K1POWER_DIOPTERS: 43.75
OS_K2POWER_DIOPTERS: 44.25
OS_AXISANGLE_DEGREES: 101
OD_AXISANGLE_DEGREES: 092
OD_K1POWER_DIOPTERS: 43.50
OD_K2POWER_DIOPTERS: 44.50

## 2025-02-13 ASSESSMENT — REFRACTION_AUTOREFRACTION
OD_CYLINDER: -0.75
OD_SPHERE: +0.50
OS_AXIS: 000
OD_AXIS: 013
OS_SPHERE: +25.00
OS_CYLINDER: SPHERE

## 2025-02-13 ASSESSMENT — REFRACTION_CURRENTRX
OS_CYLINDER: -0.50
OS_VPRISM_DIRECTION: PROGS
OD_CYLINDER: -1.25
OS_AXIS: 64
OD_VPRISM_DIRECTION: PROGS
OS_OVR_VA: 20/
OD_SPHERE: +0.75
OD_AXIS: 8
OS_SPHERE: +3.25
OD_OVR_VA: 20/
OD_ADD: +2.50
OS_ADD: +2.50

## 2025-02-13 ASSESSMENT — TONOMETRY
OS_IOP_MMHG: 21
OD_IOP_MMHG: 21

## 2025-02-13 ASSESSMENT — CONFRONTATIONAL VISUAL FIELD TEST (CVF)
OS_FINDINGS: FULL
OD_FINDINGS: FULL

## 2025-02-13 ASSESSMENT — VISUAL ACUITY
OS_BCVA: 20/25
OD_BCVA: 20/50

## 2025-02-21 PROBLEM — R97.20 RISING PSA LEVEL: Status: ACTIVE | Noted: 2025-02-21

## 2025-03-12 ENCOUNTER — RX RENEWAL (OUTPATIENT)
Age: 70
End: 2025-03-12

## 2025-04-04 ENCOUNTER — ASC (OUTPATIENT)
Dept: URBAN - METROPOLITAN AREA SURGERY 8 | Facility: SURGERY | Age: 70
Setting detail: OPHTHALMOLOGY
End: 2025-04-04
Payer: COMMERCIAL

## 2025-04-04 ENCOUNTER — RX ONLY (RX ONLY)
Age: 70
End: 2025-04-04

## 2025-04-04 DIAGNOSIS — H26.492: ICD-10-CM

## 2025-04-04 PROCEDURE — 66821 AFTER CATARACT LASER SURGERY: CPT | Mod: LT | Performed by: OPHTHALMOLOGY

## 2025-04-04 ASSESSMENT — REFRACTION_MANIFEST
OD_VA1: 20/50
OD_AXIS: 015
OS_AXIS: 55
OS_SPHERE: +3.75
OD_VA1: 20/NI
OD_CYLINDER: -0.75
OD_SPHERE: +0.50
OS_AXIS: 015
OS_VA1: 20/50+3
OD_CYLINDER: -1.25
OS_VA1: 20/20
OD_AXIS: 05
OS_SPHERE: +0.75
OS_CYLINDER: -0.50
OD_SPHERE: +1.25
OS_CYLINDER: -0.75

## 2025-04-04 ASSESSMENT — REFRACTION_CURRENTRX
OS_SPHERE: +3.25
OS_CYLINDER: -0.50
OD_OVR_VA: 20/
OD_SPHERE: +0.75
OS_AXIS: 64
OS_ADD: +2.50
OD_ADD: +2.50
OD_AXIS: 8
OS_VPRISM_DIRECTION: PROGS
OS_OVR_VA: 20/
OD_VPRISM_DIRECTION: PROGS
OD_CYLINDER: -1.25

## 2025-04-04 ASSESSMENT — REFRACTION_AUTOREFRACTION
OS_SPHERE: +25.00
OD_CYLINDER: -0.75
OS_CYLINDER: SPHERE
OD_AXIS: 013
OS_AXIS: 000
OD_SPHERE: +0.50

## 2025-04-04 ASSESSMENT — CONFRONTATIONAL VISUAL FIELD TEST (CVF)
OS_FINDINGS: FULL
OD_FINDINGS: FULL

## 2025-04-04 ASSESSMENT — KERATOMETRY
OD_K2POWER_DIOPTERS: 44.50
METHOD_AUTO_MANUAL: AUTO
OS_K1POWER_DIOPTERS: 43.75
OD_AXISANGLE_DEGREES: 092
OD_K1POWER_DIOPTERS: 43.50
OS_K2POWER_DIOPTERS: 44.25
OS_AXISANGLE_DEGREES: 101

## 2025-04-04 ASSESSMENT — VISUAL ACUITY
OD_BCVA: 20/50
OS_BCVA: 20/25

## 2025-04-22 ENCOUNTER — OFFICE (OUTPATIENT)
Dept: URBAN - METROPOLITAN AREA CLINIC 12 | Facility: CLINIC | Age: 70
Setting detail: OPHTHALMOLOGY
End: 2025-04-22
Payer: COMMERCIAL

## 2025-04-22 DIAGNOSIS — H26.492: ICD-10-CM

## 2025-04-22 PROCEDURE — 99024 POSTOP FOLLOW-UP VISIT: CPT | Performed by: OPTOMETRIST

## 2025-04-22 ASSESSMENT — REFRACTION_MANIFEST
OS_CYLINDER: -0.75
OD_CYLINDER: -0.75
OD_SPHERE: +1.25
OD_AXIS: 015
OD_SPHERE: +0.50
OS_SPHERE: +3.75
OS_VA1: 20/50+3
OS_AXIS: 55
OS_CYLINDER: -0.50
OD_CYLINDER: -1.25
OS_VA1: 20/20
OS_SPHERE: +0.75
OD_VA1: 20/50
OD_AXIS: 05
OS_AXIS: 015
OD_VA1: 20/NI

## 2025-04-22 ASSESSMENT — REFRACTION_CURRENTRX
OD_CYLINDER: -1.25
OD_SPHERE: +0.75
OD_OVR_VA: 20/
OS_CYLINDER: -0.50
OD_AXIS: 8
OS_OVR_VA: 20/
OS_AXIS: 64
OS_SPHERE: +3.25
OS_ADD: +2.50
OS_VPRISM_DIRECTION: PROGS
OD_VPRISM_DIRECTION: PROGS
OD_ADD: +2.50

## 2025-04-22 ASSESSMENT — KERATOMETRY
METHOD_AUTO_MANUAL: AUTO
OS_K2POWER_DIOPTERS: 44.25
OD_K1POWER_DIOPTERS: 43.25
OD_K2POWER_DIOPTERS: 44.50
OD_AXISANGLE_DEGREES: 093
OS_K1POWER_DIOPTERS: 43.75
OS_AXISANGLE_DEGREES: 092

## 2025-04-22 ASSESSMENT — TONOMETRY
OD_IOP_MMHG: 16
OS_IOP_MMHG: 20

## 2025-04-22 ASSESSMENT — REFRACTION_AUTOREFRACTION
OS_AXIS: 000
OD_CYLINDER: -0.50
OS_CYLINDER: SPHERE
OS_SPHERE: +0.25
OD_SPHERE: +0.50
OD_AXIS: 006

## 2025-04-22 ASSESSMENT — VISUAL ACUITY
OS_BCVA: 20/20-2
OD_BCVA: 20/20

## 2025-04-22 ASSESSMENT — CONFRONTATIONAL VISUAL FIELD TEST (CVF)
OS_FINDINGS: FULL
OD_FINDINGS: FULL